# Patient Record
(demographics unavailable — no encounter records)

---

## 2024-10-28 NOTE — REASON FOR VISIT
[Follow-Up: _____] : a [unfilled] follow-up visit [Home] : at home, [unfilled] , at the time of the visit. [Medical Office: ([facility])___] : at the medical office located in  [Verbal consent obtained from patient] : the patient, [unfilled]

## 2024-10-28 NOTE — HISTORY OF PRESENT ILLNESS
[FreeTextEntry1] : Mr. Yu is a pleasant 67yo male who presents today to [facility] care, former patient of Dr. Self.  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

## 2024-10-28 NOTE — REASON FOR VISIT
[Follow-Up: _____] : a [unfilled] follow-up visit [Home] : at home, [unfilled] , at the time of the visit. [Medical Office: ([facility])___] : at the medical office located in  [Verbal consent obtained from patient] : the patient, [unfilled]

## 2024-10-28 NOTE — ASSESSMENT
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 Mendy Schroeder) Unclear when he stopped ASA  MRA Head and Neck w/wo reveals patent stent construct of the left internal carotid artery without any evidence of flow limitation; stable left ICA pseudoaneurysm; normal intracranial vessels  We discussed his anticoagulation.  I recommend restarting an antiplatelet medication, such as ASA 81mg daily.  I will discuss this with his PCP as his wife is concerned re: GI upset.  Will continue to monitor with routine MRA in 1 year.  Plan: Discuss addition of antiplatelet medication with PCP MRA Head and Neck w/wo NOVA Protocol in 10/2025 Follow Up with Me After Imaging

## 2024-10-28 NOTE — HISTORY OF PRESENT ILLNESS
[FreeTextEntry1] : Mr. Yu is a pleasant 67yo male who presents today to [facility] care, former patient of Dr. Self.  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

## 2025-01-21 NOTE — HISTORY OF PRESENT ILLNESS
[Home] : at home, [unfilled] , at the time of the visit. [Medical Office: ([facility])___] : at the medical office located in  [Verbal consent obtained from patient] : the patient, [unfilled] [Time Spent: ___ minutes] : I have spent [unfilled] minutes with the patient on the telephone [FreeTextEntry1] : The patient presents for an acute telephonic visit secondary to testing positive for COVID. He was exposed to COVID and his family and 3 days ago he started with symptoms which have persisted and now progressed and they include sore throat, head congestion, postnasal drip, cough and some body aches. No fever, chills, shortness of breath, chest pain, GI symptoms, rash or headache. He is taking Tylenol daytime with some benefit.

## 2025-01-21 NOTE — PLAN
[FreeTextEntry1] : Patient with COVID virus infection with mild to moderate symptoms and expresses desire to be treated. Paxlovid will not be given secondary to drug interactions especially with Xarelto. Therefore we will prescribe Lagevrio. Also discussed plenty of rest and fluids and symptomatic treatment including Mucinex DM and Flonase nasal spray. Patient told to call if symptoms persist or worsen.

## 2025-03-18 NOTE — PHYSICAL EXAM
[FreeTextEntry1] : Patient is awake and alert, pleasant and cooperative with the exam facial expression is reduced Extraocular movements are intact Intermittent chin tremor is present Intermittent  grade 1 resting tremor noticed in the left upper extremity, slight on the right Bradykinesia grade 1-2 right 2-3 on the left,  No increase in tone No difficulty getting up from the chair Deep tendon reflexes are equal and symmetric Gait is a little slow with reduced left arm swing.  No difficulty with tandem walk Pull test is negative
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
## 2025-03-18 NOTE — DISCUSSION/SUMMARY
[FreeTextEntry1] : This is a 66-year-old left-handed male who has noticed slowness in daily activities since 2019.  In October 2020, suffered a left MCA stroke On exam he is noted to have resting tremor and bradykinesia left worse than right. non complaint with medication.  COLLEEN scan declined. Uses CPAP nightly doing better Azilect 0.5 mg was not tolerated due to worsening slowness and gait Increase in levodopa to 2-1/2 tablets caused nausea Did not tolerate Belsomra Did not try mirtazapine  Impression; parkinsonism most likely idiopathic Parkinson's disease  Plan Encouraged to take medications on time,continue Sinemet 25/100, 2 - 2 - 1.  Controlled release at bedtime.  In the past did not tolerate increasing Sinemet to 2-1/2 tablets.  At next visit may consider ongentys.  Has some cognitive issues would be careful with dopamine agonist -States that he is sleeping better denies any RBD -Patient also follows with Dr. Chance for cognitive changes and Dr. Lane for stroke -They had questions regarding DBS and focused ultrasound which were addressed Prescription for PT and OT provided  all questions answered  Return to clinic in 3 months We discussed the above impression, plan and recommendation during the visit. Counseling represented more than 50% of the 30-minute visit time

## 2025-05-14 NOTE — REVIEW OF SYSTEMS
[Feeling Tired] : feeling tired [see HPI] : see HPI [Difficulty Walking] : difficulty walking [Negative] : Heme/Lymph
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
## 2025-05-15 NOTE — DATA REVIEWED
[FreeTextEntry1] : Echocardiogram June 2024 with normal LVEF with grade 1 diastolic dysfunction, mild ND holter monitor December 2021 with normal sinus rhythm with one 3 beat SVT Nuclear stress February 2021 = normal

## 2025-05-15 NOTE — PHYSICAL EXAM
[General Appearance - Alert] : alert [General Appearance - In No Acute Distress] : in no acute distress [Sclera] : the sclera and conjunctiva were normal [PERRL With Normal Accommodation] : pupils were equal in size, round, and reactive to light [Extraocular Movements] : extraocular movements were intact [Outer Ear] : the ears and nose were normal in appearance [Oropharynx] : the oropharynx was normal [Neck Appearance] : the appearance of the neck was normal [Neck Cervical Mass (___cm)] : no neck mass was observed [Jugular Venous Distention Increased] : there was no jugular-venous distention [Thyroid Diffuse Enlargement] : the thyroid was not enlarged [Thyroid Nodule] : there were no palpable thyroid nodules [Auscultation Breath Sounds / Voice Sounds] : lungs were clear to auscultation bilaterally [Heart Rate And Rhythm] : heart rate was normal and rhythm regular [Heart Sounds] : normal S1 and S2 [Heart Sounds Gallop] : no gallops [Murmurs] : no murmurs [Heart Sounds Pericardial Friction Rub] : no pericardial rub [Arterial Pulses Carotid] : carotid pulses were normal with no bruits [Abdominal Aorta] : the abdominal aorta was normal [Arterial Pulses Femoral] : femoral pulses were normal without bruits [Full Pulse] : the pedal pulses are present [Edema] : there was no peripheral edema [Veins - Varicosity Changes] : there were no varicosital changes [Bowel Sounds] : normal bowel sounds [Abdomen Soft] : soft [Abdomen Tenderness] : non-tender [Abdomen Mass (___ Cm)] : no abdominal mass palpated [Abdomen Hernia] : no hernia was discovered [Normal Sphincter Tone] : normal sphincter tone [No Rectal Mass] : no rectal mass [Prostate Enlargement] : the prostate was not enlarged [Prostate Tenderness] : the prostate was not tender [Cervical Lymph Nodes Enlarged Posterior Bilaterally] : posterior cervical [Cervical Lymph Nodes Enlarged Anterior Bilaterally] : anterior cervical [Supraclavicular Lymph Nodes Enlarged Bilaterally] : supraclavicular [Axillary Lymph Nodes Enlarged Bilaterally] : axillary [Femoral Lymph Nodes Enlarged Bilaterally] : femoral [Inguinal Lymph Nodes Enlarged Bilaterally] : inguinal [No Spinal Tenderness] : no spinal tenderness [Abnormal Walk] : normal gait [Nail Clubbing] : no clubbing  or cyanosis of the fingernails [Musculoskeletal - Swelling] : no joint swelling seen [Motor Tone] : muscle strength and tone were normal [Skin Color & Pigmentation] : normal skin color and pigmentation [Skin Turgor] : normal skin turgor [] : no rash [Cranial Nerves] : cranial nerves 2-12 were intact [No Focal Deficits] : no focal deficits [Oriented To Time, Place, And Person] : oriented to person, place, and time [Impaired Insight] : insight and judgment were intact [Affect] : the affect was normal [FreeTextEntry1] : Masked facies,mild  tremor of left hand and left leg [Over the Past 2 Weeks, Have You Felt Down, Depressed, or Hopeless?] : 1.) Over the past 2 weeks, have you felt down, depressed, or hopeless? No [Over the Past 2 Weeks, Have You Felt Little Interest or Pleasure Doing Things?] : 2.) Over the past 2 weeks, have you felt little interest or pleasure doing things? No

## 2025-05-15 NOTE — HEALTH RISK ASSESSMENT
[Fair] : ~his/her~ current health as fair  [Very Good] : ~his/her~  mood as very good [No] : In the past 12 months have you used drugs other than those required for medical reasons? No [No falls in past year] : Patient reported no falls in the past year [0] : 2) Feeling down, depressed, or hopeless: Not at all (0) [PHQ-2 Negative - No further assessment needed] : PHQ-2 Negative - No further assessment needed [Never] : Never [With Significant Other] : lives with significant other [# of Members in Household ___] :  household currently consist of [unfilled] member(s) [Retired] : retired [College] : College [] :  [# Of Children ___] : has [unfilled] children [Sexually Active] : sexually active [Feels Safe at Home] : Feels safe at home [Fully functional (bathing, dressing, toileting, transferring, walking, feeding)] : Fully functional (bathing, dressing, toileting, transferring, walking, feeding) [Fully functional (using the telephone, shopping, preparing meals, housekeeping, doing laundry, using] : Fully functional and needs no help or supervision to perform IADLs (using the telephone, shopping, preparing meals, housekeeping, doing laundry, using transportation, managing medications and managing finances) [Smoke Detector] : smoke detector [Carbon Monoxide Detector] : carbon monoxide detector [Seat Belt] :  uses seat belt [Sunscreen] : uses sunscreen [Reviewed no changes] : Reviewed, no changes [Discussed at today's visit] : Advance Directives Discussed at today's visit [Designated Healthcare Proxy] : Designated healthcare proxy [Name: ___] : Health Care Proxy's Name: [unfilled]  [None] : Patient does not have any barriers to medication adherence [Yes] : Reviewed medication list for presence of high-risk medications. [YES] : Yes [Have you attended a firearm safety workshop or class?] : A firearm safety workshop or class has been attended. [Audit-CScore] : 0 [de-identified] : exercising walking daily [de-identified] : good [[id number]] : 0 [Are there any unlocked firearms stored in your household?] : No unlocked firearms in the household. [Are there any firearms stored in your household that are loaded?] : No firearms are stored in the household loaded. [Are there any children in your household?] : No children are in the household. [Has anyone in the household been feeling low/depressed/been struggling?] : No one in the household has been feeling low/depressed/been struggling. 

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
## 2025-05-15 NOTE — DATA REVIEWED
[FreeTextEntry1] : Echocardiogram June 2024 with normal LVEF with grade 1 diastolic dysfunction, mild [place] holter monitor December 2021 with normal sinus rhythm with one 3 beat SVT Nuclear stress February 2021 = normal

## 2025-05-15 NOTE — COUNSELING
[Fall prevention counseling provided] : Fall prevention counseling provided [Healthy eating counseling provided] : healthy eating [Walking] : Walking [None] : None [Good understanding] : Patient has a good understanding of lifestyle changes and the steps needed to achieve self management goals [FreeTextEntry1] : Avoid on even surfaces

## 2025-05-15 NOTE — HEALTH RISK ASSESSMENT
[Fair] : ~his/her~ current health as fair  [Very Good] : ~his/her~  mood as very good [No] : In the past 12 months have you used drugs other than those required for medical reasons? No [No falls in past year] : Patient reported no falls in the past year [0] : 2) Feeling down, depressed, or hopeless: Not at all (0) [PHQ-2 Negative - No further assessment needed] : PHQ-2 Negative - No further assessment needed [Never] : Never [With Significant Other] : lives with significant other [# of Members in Household ___] :  household currently consist of [unfilled] member(s) [Retired] : retired [College] : College [] :  [# Of Children ___] : has [unfilled] children [Sexually Active] : sexually active [Feels Safe at Home] : Feels safe at home [Fully functional (bathing, dressing, toileting, transferring, walking, feeding)] : Fully functional (bathing, dressing, toileting, transferring, walking, feeding) [Fully functional (using the telephone, shopping, preparing meals, housekeeping, doing laundry, using] : Fully functional and needs no help or supervision to perform IADLs (using the telephone, shopping, preparing meals, housekeeping, doing laundry, using transportation, managing medications and managing finances) [Smoke Detector] : smoke detector [Carbon Monoxide Detector] : carbon monoxide detector [Seat Belt] :  uses seat belt [Sunscreen] : uses sunscreen [Reviewed no changes] : Reviewed, no changes [Discussed at today's visit] : Advance Directives Discussed at today's visit [Designated Healthcare Proxy] : Designated healthcare proxy [Name: ___] : Health Care Proxy's Name: [unfilled]  [None] : Patient does not have any barriers to medication adherence [Yes] : Reviewed medication list for presence of high-risk medications. [YES] : Yes [Have you attended a firearm safety workshop or class?] : A firearm safety workshop or class has been attended. [Audit-CScore] : 0 [de-identified] : exercising walking daily [de-identified] : good [[id number]] : 0 [Are there any unlocked firearms stored in your household?] : No unlocked firearms in the household. [Are there any firearms stored in your household that are loaded?] : No firearms are stored in the household loaded. [Are there any children in your household?] : No children are in the household. [Has anyone in the household been feeling low/depressed/been struggling?] : No one in the household has been feeling low/depressed/been struggling. 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

## 2025-06-06 NOTE — CONSULT LETTER
[Dear  ___] : Dear  [unfilled], [Courtesy Letter:] : I had the pleasure of seeing your patient, [unfilled], in my office today. [Please see my note below.] : Please see my note below. [Consult Closing:] : Thank you very much for allowing me to participate in the care of this patient.  If you have any questions, please do not hesitate to contact me. [Sincerely,] : Sincerely, [FreeTextEntry3] : Alejo Crystal MD.

## 2025-06-06 NOTE — DATA REVIEWED
[de-identified] : Brain MRI on 10/5/2020 demonstrated a small left frontal cortical infarct in the distribution of the left anterior middle cerebral artery territory.\par  There was occlusion of the left internal carotid artery with good intracranial cross-filling of the left anterior, middle cerebral arteries.\par

## 2025-06-06 NOTE — PHYSICAL EXAM
[General Appearance - Alert] : alert [General Appearance - In No Acute Distress] : in no acute distress [Oriented To Time, Place, And Person] : oriented to person, place, and time [Affect] : the affect was normal [Memory Recent] : recent memory was not impaired [Memory Remote] : remote memory was not impaired [Cranial Nerves Optic (II)] : visual acuity intact bilaterally,  visual fields full to confrontation, pupils equal round and reactive to light [Cranial Nerves Oculomotor (III)] : extraocular motion intact [Cranial Nerves Trigeminal (V)] : facial sensation intact symmetrically [Cranial Nerves Facial (VII)] : face symmetrical [Cranial Nerves Vestibulocochlear (VIII)] : hearing was intact bilaterally [Cranial Nerves Glossopharyngeal (IX)] : tongue and palate midline [Cranial Nerves Accessory (XI - Cranial And Spinal)] : head turning and shoulder shrug symmetric [Cranial Nerves Hypoglossal (XII)] : there was no tongue deviation with protrusion [Motor Tone] : muscle tone was normal in all four extremities [Motor Strength] : muscle strength was normal in all four extremities [Sensation Tactile Decrease] : light touch was intact [Sensation Pain / Temperature Decrease] : pain and temperature was intact [Sensation Vibration Decrease] : vibration was intact [2+] : Patella left 2+ [Optic Disc Abnormality] : the optic disc were normal in size and color [Tremor] : a tremor present [Dysarthria] : no dysarthria [Aphasia] : no dysphasia/aphasia [Romberg's Sign] : Romberg's sign was negtive [Coordination - Dysmetria Impaired Finger-to-Nose Bilateral] : not present [Plantar Reflex Right Only] : normal on the right [Plantar Reflex Left Only] : normal on the left [FreeTextEntry8] : His gait is broad-based.  He is ambulating without an assistive device.

## 2025-06-06 NOTE — ASSESSMENT
[FreeTextEntry1] : This is a 66-year-old man who is status post stroke. He underwent revascularization with stenting of the left carotid artery. He was ultimately found to have atrial fibrillation. He is now on anticoagulation and statin.  He has mild parkinsonism for which he follows with a movement disorder specialist. I would recommend he continue Sinemet at his current dosage for now.  I will see him in one year.
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
